# Patient Record
Sex: MALE | URBAN - METROPOLITAN AREA
[De-identification: names, ages, dates, MRNs, and addresses within clinical notes are randomized per-mention and may not be internally consistent; named-entity substitution may affect disease eponyms.]

---

## 2023-07-13 ENCOUNTER — APPOINTMENT (OUTPATIENT)
Dept: RADIOLOGY | Facility: MEDICAL CENTER | Age: 88
End: 2023-07-13
Attending: EMERGENCY MEDICINE

## 2023-07-13 ENCOUNTER — HOSPITAL ENCOUNTER (EMERGENCY)
Facility: MEDICAL CENTER | Age: 88
End: 2023-07-14
Attending: EMERGENCY MEDICINE

## 2023-07-13 DIAGNOSIS — R19.7 DIARRHEA, UNSPECIFIED TYPE: ICD-10-CM

## 2023-07-13 LAB
ALBUMIN SERPL BCP-MCNC: 4.7 G/DL (ref 3.2–4.9)
ALBUMIN/GLOB SERPL: 1.7 G/DL
ALP SERPL-CCNC: 55 U/L (ref 30–99)
ALT SERPL-CCNC: 14 U/L (ref 2–50)
ANION GAP SERPL CALC-SCNC: 14 MMOL/L (ref 7–16)
APPEARANCE UR: CLEAR
AST SERPL-CCNC: 30 U/L (ref 12–45)
BASOPHILS # BLD AUTO: 0.3 % (ref 0–1.8)
BASOPHILS # BLD: 0.03 K/UL (ref 0–0.12)
BILIRUB SERPL-MCNC: 0.5 MG/DL (ref 0.1–1.5)
BILIRUB UR QL STRIP.AUTO: NEGATIVE
BUN SERPL-MCNC: 31 MG/DL (ref 8–22)
CALCIUM ALBUM COR SERPL-MCNC: 8.9 MG/DL (ref 8.5–10.5)
CALCIUM SERPL-MCNC: 9.5 MG/DL (ref 8.5–10.5)
CHLORIDE SERPL-SCNC: 98 MMOL/L (ref 96–112)
CO2 SERPL-SCNC: 20 MMOL/L (ref 20–33)
COLOR UR: YELLOW
CREAT SERPL-MCNC: 1.25 MG/DL (ref 0.5–1.4)
EKG IMPRESSION: NORMAL
EOSINOPHIL # BLD AUTO: 0.03 K/UL (ref 0–0.51)
EOSINOPHIL NFR BLD: 0.3 % (ref 0–6.9)
ERYTHROCYTE [DISTWIDTH] IN BLOOD BY AUTOMATED COUNT: 47.4 FL (ref 35.9–50)
FLUAV RNA SPEC QL NAA+PROBE: NEGATIVE
FLUBV RNA SPEC QL NAA+PROBE: NEGATIVE
GFR SERPLBLD CREATININE-BSD FMLA CKD-EPI: 56 ML/MIN/1.73 M 2
GLOBULIN SER CALC-MCNC: 2.7 G/DL (ref 1.9–3.5)
GLUCOSE BLD STRIP.AUTO-MCNC: 94 MG/DL (ref 65–99)
GLUCOSE SERPL-MCNC: 92 MG/DL (ref 65–99)
GLUCOSE UR STRIP.AUTO-MCNC: NEGATIVE MG/DL
HCT VFR BLD AUTO: 34.1 % (ref 42–52)
HGB BLD-MCNC: 11.9 G/DL (ref 14–18)
IMM GRANULOCYTES # BLD AUTO: 0.14 K/UL (ref 0–0.11)
IMM GRANULOCYTES NFR BLD AUTO: 1.3 % (ref 0–0.9)
KETONES UR STRIP.AUTO-MCNC: NEGATIVE MG/DL
LACTATE SERPL-SCNC: 1.9 MMOL/L (ref 0.5–2)
LEUKOCYTE ESTERASE UR QL STRIP.AUTO: NEGATIVE
LIPASE SERPL-CCNC: 40 U/L (ref 11–82)
LYMPHOCYTES # BLD AUTO: 0.74 K/UL (ref 1–4.8)
LYMPHOCYTES NFR BLD: 6.8 % (ref 22–41)
MCH RBC QN AUTO: 31.5 PG (ref 27–33)
MCHC RBC AUTO-ENTMCNC: 34.9 G/DL (ref 32.3–36.5)
MCV RBC AUTO: 90.2 FL (ref 81.4–97.8)
MICRO URNS: NORMAL
MONOCYTES # BLD AUTO: 0.62 K/UL (ref 0–0.85)
MONOCYTES NFR BLD AUTO: 5.7 % (ref 0–13.4)
NEUTROPHILS # BLD AUTO: 9.29 K/UL (ref 1.82–7.42)
NEUTROPHILS NFR BLD: 85.6 % (ref 44–72)
NITRITE UR QL STRIP.AUTO: NEGATIVE
NRBC # BLD AUTO: 0 K/UL
NRBC BLD-RTO: 0 /100 WBC (ref 0–0.2)
PH UR STRIP.AUTO: 5.5 [PH] (ref 5–8)
PLATELET # BLD AUTO: 251 K/UL (ref 164–446)
PMV BLD AUTO: 9.6 FL (ref 9–12.9)
POTASSIUM SERPL-SCNC: 4.8 MMOL/L (ref 3.6–5.5)
PROT SERPL-MCNC: 7.4 G/DL (ref 6–8.2)
PROT UR QL STRIP: NEGATIVE MG/DL
RBC # BLD AUTO: 3.78 M/UL (ref 4.7–6.1)
RBC UR QL AUTO: NEGATIVE
RSV RNA SPEC QL NAA+PROBE: NEGATIVE
SARS-COV-2 RNA RESP QL NAA+PROBE: NOTDETECTED
SODIUM SERPL-SCNC: 132 MMOL/L (ref 135–145)
SP GR UR STRIP.AUTO: 1.02
SPECIMEN SOURCE: NORMAL
UROBILINOGEN UR STRIP.AUTO-MCNC: 0.2 MG/DL
WBC # BLD AUTO: 10.9 K/UL (ref 4.8–10.8)

## 2023-07-13 PROCEDURE — 84484 ASSAY OF TROPONIN QUANT: CPT

## 2023-07-13 PROCEDURE — 83690 ASSAY OF LIPASE: CPT

## 2023-07-13 PROCEDURE — 700105 HCHG RX REV CODE 258: Mod: JZ | Performed by: EMERGENCY MEDICINE

## 2023-07-13 PROCEDURE — 93005 ELECTROCARDIOGRAM TRACING: CPT | Performed by: EMERGENCY MEDICINE

## 2023-07-13 PROCEDURE — 81003 URINALYSIS AUTO W/O SCOPE: CPT

## 2023-07-13 PROCEDURE — 71045 X-RAY EXAM CHEST 1 VIEW: CPT

## 2023-07-13 PROCEDURE — 85025 COMPLETE CBC W/AUTO DIFF WBC: CPT

## 2023-07-13 PROCEDURE — C9803 HOPD COVID-19 SPEC COLLECT: HCPCS | Performed by: EMERGENCY MEDICINE

## 2023-07-13 PROCEDURE — 99284 EMERGENCY DEPT VISIT MOD MDM: CPT

## 2023-07-13 PROCEDURE — 80053 COMPREHEN METABOLIC PANEL: CPT

## 2023-07-13 PROCEDURE — 82962 GLUCOSE BLOOD TEST: CPT

## 2023-07-13 PROCEDURE — 36415 COLL VENOUS BLD VENIPUNCTURE: CPT

## 2023-07-13 PROCEDURE — 93005 ELECTROCARDIOGRAM TRACING: CPT

## 2023-07-13 PROCEDURE — 83605 ASSAY OF LACTIC ACID: CPT

## 2023-07-13 PROCEDURE — 0241U HCHG SARS-COV-2 COVID-19 NFCT DS RESP RNA 4 TRGT MIC: CPT

## 2023-07-13 RX ORDER — SODIUM CHLORIDE, SODIUM LACTATE, POTASSIUM CHLORIDE, CALCIUM CHLORIDE 600; 310; 30; 20 MG/100ML; MG/100ML; MG/100ML; MG/100ML
1000 INJECTION, SOLUTION INTRAVENOUS ONCE
Status: COMPLETED | OUTPATIENT
Start: 2023-07-13 | End: 2023-07-13

## 2023-07-13 RX ORDER — SODIUM CHLORIDE, SODIUM LACTATE, POTASSIUM CHLORIDE, CALCIUM CHLORIDE 600; 310; 30; 20 MG/100ML; MG/100ML; MG/100ML; MG/100ML
1000 INJECTION, SOLUTION INTRAVENOUS ONCE
Status: COMPLETED | OUTPATIENT
Start: 2023-07-13 | End: 2023-07-14

## 2023-07-13 RX ADMIN — SODIUM CHLORIDE, POTASSIUM CHLORIDE, SODIUM LACTATE AND CALCIUM CHLORIDE 1000 ML: 600; 310; 30; 20 INJECTION, SOLUTION INTRAVENOUS at 21:15

## 2023-07-13 RX ADMIN — SODIUM CHLORIDE, POTASSIUM CHLORIDE, SODIUM LACTATE AND CALCIUM CHLORIDE 1000 ML: 600; 310; 30; 20 INJECTION, SOLUTION INTRAVENOUS at 23:15

## 2023-07-14 VITALS
SYSTOLIC BLOOD PRESSURE: 149 MMHG | BODY MASS INDEX: 22.53 KG/M2 | HEART RATE: 80 BPM | WEIGHT: 132 LBS | DIASTOLIC BLOOD PRESSURE: 68 MMHG | HEIGHT: 64 IN | RESPIRATION RATE: 16 BRPM | TEMPERATURE: 97.9 F | OXYGEN SATURATION: 95 %

## 2023-07-14 LAB — TROPONIN T SERPL-MCNC: 20 NG/L (ref 6–19)

## 2023-07-14 NOTE — ED NOTES
"Discharge instructions reviewed with patient/family.  Patient verbalizes understanding of follow up care, medication management and reasons to return to the ER. PIV Removed with no complications. BP (!) 149/68   Pulse 80   Temp 36.6 °C (97.9 °F)   Resp 16   Ht 1.626 m (5' 4\")   Wt 59.9 kg (132 lb)   SpO2 95%   BMI 22.66 kg/m²   "

## 2023-07-14 NOTE — ED NOTES
Patient saturations to 83% on room air. Patient placed on 2L of nasal cannula and saturations back to baseline

## 2023-07-14 NOTE — ED PROVIDER NOTES
"ED Provider Note    CHIEF COMPLAINT  Chief Complaint   Patient presents with    Nausea    Other     \"Lethargic\" traveling from [place]       LIMITATION TO HISTORY   Select: None.    HPI    Jevon Quiros is a 88 y.o. [place] today and was found by his traveling  to be lethargic in the car and brought here to the ER for further evaluation.  The lethargy was described as him being in a car that had that was not overheated but was not on.  The patient said he felt cold.  There is no alleviating factors.  No exacerbating factors associated with 1 episode of explosive diarrhea that he had trouble traveling here.  It was so explosive he had to change everything and is closed.    Prior to arrival started the day he felt normal he had a fruit shake.  He had 2 eggs.  Apparently he stays very hydrated.  He states not drinking a lot of water.  He otherwise felt well.  As they drove over here he had to go the bathroom and had explosive diarrhea.  No abdominal pain no abdominal discomfort.  No dysuria.  No coughing no difficulty breathing.  He simply started not feeling well and was brought here.    Was able to do video conferencing with his wife and a friend who was traveling with him.    OUTSIDE HISTORIAN(S):  Select: Wife states that he weighs himself every day.  He is very health-conscious.  He has a 14 pound weight loss over the last 3 months but he is also been trying to lose weight.    EXTERNAL RECORDS REVIEWED  Select: Other and has no extensive records here of previous visits.    REVIEW OF SYSTEMS  Enteral no fever or chills.    PAST MEDICAL HISTORY  Cancer of the hip with radiation  History of possible graft/bypass surgery in left leg secondary to his cancer  History of cholecystectomy and sepsis in the past    FAMILY HISTORY  No family history on file.    SOCIAL HISTORY   He lives in [place]      SURGICAL HISTORY  Cystectomy    CURRENT MEDICATIONS    Current " "Facility-Administered Medications:     lactated ringers (LR) bolus, 1,000 mL, Intravenous, Once, Alberto Mendiola M.D.  No current outpatient medications on file.    ALLERGIES  No Known Allergies    PHYSICAL EXAM  VITAL SIGNS: /52   Pulse 87   Temp 36.4 °C (97.6 °F) (Temporal)   Resp 16   Ht 1.626 m (5' 4\")   Wt 59.9 kg (132 lb)   SpO2 94%   BMI 22.66 kg/m²   Reviewed and noted.  Constitutional: Well developed, Well nourished, stated age.  Tired appearing  HENT: Normocephalic, atraumatic, bilateral external ears normal, No intraoral erythema, edema, exudate  Eyes: PERRLA, conjunctiva pink, no scleral icterus.   Cardiovascular: Regular rate and rhythm. No murmurs, rubs or gallops.  No dependent edema or calf tenderness  Respiratory: Lungs clear to auscultation bilaterally. No wheezes, rales, or rhonchi.  Abdominal:  Abdomen soft, non-tender, non distended. No rebound, or guarding.    Skin: No erythema, no rash. No wounds or bruising.  Genitourinary: No costovertebral angle tenderness.   Musculoskeletal: no deformities.   Neurologic: Alert, no facial droop noted. All extra ocular muscles intact. Moves all extremities with out weakness noed  Psychiatric: Affect normal, Judgment normal, Mood normal.         MEDICAL DECISION MAKING:  PROBLEMS EVALUATED THIS VISIT:  \"Lethargic\".  Patient appears tired but not distally lethargic.  He is awake alert he converses well.  He states that he has been having a normal state of health until today when this occurred.  Diarrhea 1 episode explosive.  No diarrhea at this time no abdominal pain no history of of any other sick contacts at this point likely infectious nonbloody.  No history or risk factors of C. difficile.  Nausea.  One-time history now resolved.  He does not feel nauseous at this time         PLAN:  Medications   lactated ringers (LR) bolus (1,000 mL Intravenous New Bag 7/13/23 2115)   Observation  CBC  Metabolic panel  Troponin  EKG.      LABS " Ordered and Reviewed by Me:  Results for orders placed or performed during the hospital encounter of 07/13/23   CBC WITH DIFFERENTIAL   Result Value Ref Range    WBC 10.9 (H) 4.8 - 10.8 K/uL    RBC 3.78 (L) 4.70 - 6.10 M/uL    Hemoglobin 11.9 (L) 14.0 - 18.0 g/dL    Hematocrit 34.1 (L) 42.0 - 52.0 %    MCV 90.2 81.4 - 97.8 fL    MCH 31.5 27.0 - 33.0 pg    MCHC 34.9 32.3 - 36.5 g/dL    RDW 47.4 35.9 - 50.0 fL    Platelet Count 251 164 - 446 K/uL    MPV 9.6 9.0 - 12.9 fL    Neutrophils-Polys 85.60 (H) 44.00 - 72.00 %    Lymphocytes 6.80 (L) 22.00 - 41.00 %    Monocytes 5.70 0.00 - 13.40 %    Eosinophils 0.30 0.00 - 6.90 %    Basophils 0.30 0.00 - 1.80 %    Immature Granulocytes 1.30 (H) 0.00 - 0.90 %    Nucleated RBC 0.00 0.00 - 0.20 /100 WBC    Neutrophils (Absolute) 9.29 (H) 1.82 - 7.42 K/uL    Lymphs (Absolute) 0.74 (L) 1.00 - 4.80 K/uL    Monos (Absolute) 0.62 0.00 - 0.85 K/uL    Eos (Absolute) 0.03 0.00 - 0.51 K/uL    Baso (Absolute) 0.03 0.00 - 0.12 K/uL    Immature Granulocytes (abs) 0.14 (H) 0.00 - 0.11 K/uL    NRBC (Absolute) 0.00 K/uL   COMP METABOLIC PANEL   Result Value Ref Range    Sodium 132 (L) 135 - 145 mmol/L    Potassium 4.8 3.6 - 5.5 mmol/L    Chloride 98 96 - 112 mmol/L    Co2 20 20 - 33 mmol/L    Anion Gap 14.0 7.0 - 16.0    Glucose 92 65 - 99 mg/dL    Bun 31 (H) 8 - 22 mg/dL    Creatinine 1.25 0.50 - 1.40 mg/dL    Calcium 9.5 8.5 - 10.5 mg/dL    AST(SGOT) 30 12 - 45 U/L    ALT(SGPT) 14 2 - 50 U/L    Alkaline Phosphatase 55 30 - 99 U/L    Total Bilirubin 0.5 0.1 - 1.5 mg/dL    Albumin 4.7 3.2 - 4.9 g/dL    Total Protein 7.4 6.0 - 8.2 g/dL    Globulin 2.7 1.9 - 3.5 g/dL    A-G Ratio 1.7 g/dL   LIPASE   Result Value Ref Range    Lipase 40 11 - 82 U/L   LACTIC ACID   Result Value Ref Range    Lactic Acid 1.9 0.5 - 2.0 mmol/L   URINALYSIS (UA)    Specimen: Urine   Result Value Ref Range    Color Yellow     Character Clear     Specific Gravity 1.019 <1.035    Ph 5.5 5.0 - 8.0    Glucose Negative  Negative mg/dL    Ketones Negative Negative mg/dL    Protein Negative Negative mg/dL    Bilirubin Negative Negative    Urobilinogen, Urine 0.2 Negative    Nitrite Negative Negative    Leukocyte Esterase Negative Negative    Occult Blood Negative Negative    Micro Urine Req see below    CoV-2, FLU A/B, and RSV by PCR (2-4 Hours CEPHEID) : Collect NP swab in VTM    Specimen: Respirate   Result Value Ref Range    Influenza virus A RNA Negative Negative    Influenza virus B, PCR Negative Negative    RSV, PCR Negative Negative    SARS-CoV-2 by PCR NotDetected     SARS-CoV-2 Source NP Swab    ESTIMATED GFR   Result Value Ref Range    GFR (CKD-EPI) 56 (A) >60 mL/min/1.73 m 2   CORRECTED CALCIUM   Result Value Ref Range    Correct Calcium 8.9 8.5 - 10.5 mg/dL   EKG   Result Value Ref Range    Report       [facility] Emergency Dept.    Test Date:  2023  Pt Name:    STEFANIE NASCIMENTO                  Department: ER  MRN:        [id number]                      Room:  Gender:     Male                         Technician: [id number]  :        [id number]                   Requested By:ER TRIAGE PROTOCOL  Order #:    [id number]                    Reading MD: Alberto WINN MD    Measurements  Intervals                                Axis  Rate:       82                           P:          -87  CA:         181                          QRS:        -41  QRSD:       143                          T:          26  QT:         425  QTc:        497    Interpretive Statements  Sinus or ectopic atrial rhythm  Rate of 82.  Interval: Normal CA.  Borderline QRS.  QTc within normal limits  Axis: Left axis deviation.  Ischemia: No ST segment elevations or depressions noted.  Likely S waves in 2  and 3  Abnormal EKG no acute ischemic findings  Electronically Signed On 2023 23:02: 09 PDT by Alberto WINN MD     POCT glucose device results   Result Value Ref Range    POC Glucose, Blood 94 65 - 99 mg/dL        Rhythm Strip: Interpretation by me heart rate of 82.    EKG Interpretation by me see labs section        RADIOLOGY  I have independently interpreted the diagnostic imaging associated with this visit and am waiting the final reading from the radiologist.   My preliminary interpretation is a follows: I reviewed the x-ray showed no infiltrates or effusions.  No pneumothorax  Radiologist interpretation:   DX-CHEST-PORTABLE (1 VIEW)   Final Result      No acute cardiopulmonary abnormality.               ED COURSE:    ED Observation Status? Yes; Patient placed in observation status at 9:09 PM 07/13/23     Observation plan is as follows:   The fluids  Lab work  Repeat evaluation  CBC  Metabolic panel  Troponin      INTERVENTIONS BY ME:  Medications   lactated ringers (LR) bolus (has no administration in time range)       Response on recheck: Rechecked the patient approximate 11:00 he is feeling better.  However hypoxia was noted.  Therefore chest x-ray is ordered he was placed on oxygen.    Then taken off oxygen he is back to room air.  This could be transient hypoxia of unknown known etiology.    I have discussed management of the patient with the following physicians and sources:   None at this time.    Patient discharged from ED observation at 1:06 AM 07/13/23        Did have an troponin added on his EKG was normal he had no chest pain.      Diagnostic tests and prescription drugs considered including, but not limited to: Select: At this point the patient is asymptomatic would like to go home..    Escalation of care considered, and ultimately not performed: Patient has observed.  Vital signs have not changed she has had a borderline elevated white cell count but no source as well clinically improved and not deteriorated..     Barriers to care at this time, including but not limited to: Select: None at this time..       FINAL DISPO PLAN   We will discharged home.    Pleasant 88-year-old male who was cold and  chilled.  However there is no source of infection do not have a favor here.  He has remained well nontoxic.  There is transient hypoxia of unknown etiology most likely to body habitus and him sleeping.  Patient at this point has resolved feeling better he is to return if symptoms worsen.    Followup:  [facility], Emergency Dept  [address] [address] [phone]  Go to   If symptoms worsen      CONDITION: Improved.     FINAL IMPRESSION  1. Diarrhea, unspecified type       ED Observation Care

## 2023-07-14 NOTE — ED NOTES
Pt resting in bed, monitor connected, no acute distress, VSS. IV fluids infusing. Phlebotomy at bedside.

## 2023-07-14 NOTE — ED TRIAGE NOTES
"Chief Complaint   Patient presents with    Nausea    Other     \"Lethargic\" traveling from [place]     Pt wheeled to triage , traveling from [place] onset of generalized weakness, hot/cold sensation. Pt was 88% on room air, placed 2L nc.   Pt was in car for 15minutes.  No neuro deficit noted, fsbs 94  "